# Patient Record
Sex: MALE | Race: WHITE | NOT HISPANIC OR LATINO | Employment: FULL TIME | URBAN - METROPOLITAN AREA
[De-identification: names, ages, dates, MRNs, and addresses within clinical notes are randomized per-mention and may not be internally consistent; named-entity substitution may affect disease eponyms.]

---

## 2023-03-24 ENCOUNTER — APPOINTMENT (OUTPATIENT)
Dept: CT IMAGING | Facility: HOSPITAL | Age: 48
End: 2023-03-24
Payer: COMMERCIAL

## 2023-03-24 ENCOUNTER — APPOINTMENT (OUTPATIENT)
Dept: GENERAL RADIOLOGY | Facility: HOSPITAL | Age: 48
End: 2023-03-24
Payer: COMMERCIAL

## 2023-03-24 ENCOUNTER — HOSPITAL ENCOUNTER (EMERGENCY)
Facility: HOSPITAL | Age: 48
Discharge: HOME | End: 2023-03-24
Attending: EMERGENCY MEDICINE | Admitting: EMERGENCY MEDICINE
Payer: COMMERCIAL

## 2023-03-24 VITALS
DIASTOLIC BLOOD PRESSURE: 100 MMHG | BODY MASS INDEX: 20.8 KG/M2 | HEIGHT: 70 IN | SYSTOLIC BLOOD PRESSURE: 138 MMHG | WEIGHT: 145.28 LBS | TEMPERATURE: 98.1 F | OXYGEN SATURATION: 97 % | RESPIRATION RATE: 20 BRPM | HEART RATE: 100 BPM

## 2023-03-24 DIAGNOSIS — R51.9 ACUTE NONINTRACTABLE HEADACHE, UNSPECIFIED HEADACHE TYPE: Primary | ICD-10-CM

## 2023-03-24 DIAGNOSIS — R74.8 ELEVATED LIVER ENZYMES: ICD-10-CM

## 2023-03-24 DIAGNOSIS — E86.0 DEHYDRATION: ICD-10-CM

## 2023-03-24 LAB
ALBUMIN SERPL-MCNC: 4.2 G/DL (ref 3.5–5.2)
ALBUMIN/GLOB SERPL: 1.1 G/DL
ALP SERPL-CCNC: 144 U/L (ref 39–117)
ALT SERPL W P-5'-P-CCNC: 105 U/L (ref 1–41)
ANION GAP SERPL CALCULATED.3IONS-SCNC: 14.4 MMOL/L (ref 5–15)
AST SERPL-CCNC: 88 U/L (ref 1–40)
BACTERIA UR QL AUTO: ABNORMAL /HPF
BASOPHILS # BLD AUTO: 0.04 10*3/MM3 (ref 0–0.2)
BASOPHILS NFR BLD AUTO: 0.4 % (ref 0–1.5)
BILIRUB SERPL-MCNC: 0.4 MG/DL (ref 0–1.2)
BILIRUB UR QL STRIP: NEGATIVE
BUN SERPL-MCNC: 23 MG/DL (ref 6–20)
BUN/CREAT SERPL: 23 (ref 7–25)
CALCIUM SPEC-SCNC: 9.9 MG/DL (ref 8.6–10.5)
CHLORIDE SERPL-SCNC: 95 MMOL/L (ref 98–107)
CLARITY UR: CLEAR
CO2 SERPL-SCNC: 22.6 MMOL/L (ref 22–29)
COLOR UR: ABNORMAL
CREAT SERPL-MCNC: 1 MG/DL (ref 0.76–1.27)
DEPRECATED RDW RBC AUTO: 37.9 FL (ref 37–54)
EGFRCR SERPLBLD CKD-EPI 2021: 92.8 ML/MIN/1.73
EOSINOPHIL # BLD AUTO: 0 10*3/MM3 (ref 0–0.4)
EOSINOPHIL NFR BLD AUTO: 0 % (ref 0.3–6.2)
ERYTHROCYTE [DISTWIDTH] IN BLOOD BY AUTOMATED COUNT: 12.4 % (ref 12.3–15.4)
FLUAV AG NPH QL: NEGATIVE
FLUBV AG NPH QL IA: NEGATIVE
GLOBULIN UR ELPH-MCNC: 3.7 GM/DL
GLUCOSE SERPL-MCNC: 127 MG/DL (ref 65–99)
GLUCOSE UR STRIP-MCNC: NEGATIVE MG/DL
HCT VFR BLD AUTO: 51 % (ref 37.5–51)
HGB BLD-MCNC: 17.7 G/DL (ref 13–17.7)
HGB UR QL STRIP.AUTO: NEGATIVE
HOLD SPECIMEN: NORMAL
HOLD SPECIMEN: NORMAL
HYALINE CASTS UR QL AUTO: ABNORMAL /LPF
IMM GRANULOCYTES # BLD AUTO: 0.03 10*3/MM3 (ref 0–0.05)
IMM GRANULOCYTES NFR BLD AUTO: 0.3 % (ref 0–0.5)
KETONES UR QL STRIP: ABNORMAL
LEUKOCYTE ESTERASE UR QL STRIP.AUTO: NEGATIVE
LYMPHOCYTES # BLD AUTO: 0.94 10*3/MM3 (ref 0.7–3.1)
LYMPHOCYTES NFR BLD AUTO: 9.8 % (ref 19.6–45.3)
MAGNESIUM SERPL-MCNC: 2.1 MG/DL (ref 1.6–2.6)
MCH RBC QN AUTO: 29.3 PG (ref 26.6–33)
MCHC RBC AUTO-ENTMCNC: 34.7 G/DL (ref 31.5–35.7)
MCV RBC AUTO: 84.4 FL (ref 79–97)
MONOCYTES # BLD AUTO: 0.96 10*3/MM3 (ref 0.1–0.9)
MONOCYTES NFR BLD AUTO: 10 % (ref 5–12)
MUCOUS THREADS URNS QL MICRO: ABNORMAL /HPF
NEUTROPHILS NFR BLD AUTO: 7.63 10*3/MM3 (ref 1.7–7)
NEUTROPHILS NFR BLD AUTO: 79.5 % (ref 42.7–76)
NITRITE UR QL STRIP: NEGATIVE
NRBC BLD AUTO-RTO: 0 /100 WBC (ref 0–0.2)
PH UR STRIP.AUTO: 5.5 [PH] (ref 5–8)
PLATELET # BLD AUTO: 217 10*3/MM3 (ref 140–450)
PMV BLD AUTO: 9.1 FL (ref 6–12)
POTASSIUM SERPL-SCNC: 4.6 MMOL/L (ref 3.5–5.2)
PROT SERPL-MCNC: 7.9 G/DL (ref 6–8.5)
PROT UR QL STRIP: ABNORMAL
RBC # BLD AUTO: 6.04 10*6/MM3 (ref 4.14–5.8)
RBC # UR STRIP: ABNORMAL /HPF
REF LAB TEST METHOD: ABNORMAL
SARS-COV-2 RNA RESP QL NAA+PROBE: NOT DETECTED
SODIUM SERPL-SCNC: 132 MMOL/L (ref 136–145)
SP GR UR STRIP: >1.03 (ref 1–1.03)
SQUAMOUS #/AREA URNS HPF: ABNORMAL /HPF
TROPONIN T SERPL HS-MCNC: <6 NG/L
UROBILINOGEN UR QL STRIP: ABNORMAL
WBC # UR STRIP: ABNORMAL /HPF
WBC NRBC COR # BLD: 9.6 10*3/MM3 (ref 3.4–10.8)
WHOLE BLOOD HOLD COAG: NORMAL
WHOLE BLOOD HOLD SPECIMEN: NORMAL

## 2023-03-24 PROCEDURE — 85025 COMPLETE CBC W/AUTO DIFF WBC: CPT | Performed by: EMERGENCY MEDICINE

## 2023-03-24 PROCEDURE — 99283 EMERGENCY DEPT VISIT LOW MDM: CPT

## 2023-03-24 PROCEDURE — 93005 ELECTROCARDIOGRAM TRACING: CPT | Performed by: EMERGENCY MEDICINE

## 2023-03-24 PROCEDURE — 25010000002 KETOROLAC TROMETHAMINE PER 15 MG: Performed by: EMERGENCY MEDICINE

## 2023-03-24 PROCEDURE — 36415 COLL VENOUS BLD VENIPUNCTURE: CPT | Performed by: EMERGENCY MEDICINE

## 2023-03-24 PROCEDURE — 81001 URINALYSIS AUTO W/SCOPE: CPT | Performed by: EMERGENCY MEDICINE

## 2023-03-24 PROCEDURE — 80053 COMPREHEN METABOLIC PANEL: CPT | Performed by: EMERGENCY MEDICINE

## 2023-03-24 PROCEDURE — 71045 X-RAY EXAM CHEST 1 VIEW: CPT

## 2023-03-24 PROCEDURE — 84484 ASSAY OF TROPONIN QUANT: CPT | Performed by: EMERGENCY MEDICINE

## 2023-03-24 PROCEDURE — 70450 CT HEAD/BRAIN W/O DYE: CPT

## 2023-03-24 PROCEDURE — 87804 INFLUENZA ASSAY W/OPTIC: CPT | Performed by: EMERGENCY MEDICINE

## 2023-03-24 PROCEDURE — 96375 TX/PRO/DX INJ NEW DRUG ADDON: CPT

## 2023-03-24 PROCEDURE — U0004 COV-19 TEST NON-CDC HGH THRU: HCPCS | Performed by: EMERGENCY MEDICINE

## 2023-03-24 PROCEDURE — C9803 HOPD COVID-19 SPEC COLLECT: HCPCS | Performed by: EMERGENCY MEDICINE

## 2023-03-24 PROCEDURE — 25010000002 DIPHENHYDRAMINE PER 50 MG: Performed by: EMERGENCY MEDICINE

## 2023-03-24 PROCEDURE — 93005 ELECTROCARDIOGRAM TRACING: CPT

## 2023-03-24 PROCEDURE — 25010000002 METOCLOPRAMIDE PER 10 MG: Performed by: EMERGENCY MEDICINE

## 2023-03-24 PROCEDURE — 93010 ELECTROCARDIOGRAM REPORT: CPT | Performed by: INTERNAL MEDICINE

## 2023-03-24 PROCEDURE — 83735 ASSAY OF MAGNESIUM: CPT | Performed by: EMERGENCY MEDICINE

## 2023-03-24 PROCEDURE — 96374 THER/PROPH/DIAG INJ IV PUSH: CPT

## 2023-03-24 RX ORDER — DIPHENHYDRAMINE HYDROCHLORIDE 50 MG/ML
25 INJECTION INTRAMUSCULAR; INTRAVENOUS ONCE
Status: COMPLETED | OUTPATIENT
Start: 2023-03-24 | End: 2023-03-24

## 2023-03-24 RX ORDER — KETOROLAC TROMETHAMINE 30 MG/ML
30 INJECTION, SOLUTION INTRAMUSCULAR; INTRAVENOUS ONCE
Status: COMPLETED | OUTPATIENT
Start: 2023-03-24 | End: 2023-03-24

## 2023-03-24 RX ORDER — METOCLOPRAMIDE HYDROCHLORIDE 5 MG/ML
10 INJECTION INTRAMUSCULAR; INTRAVENOUS ONCE
Status: COMPLETED | OUTPATIENT
Start: 2023-03-24 | End: 2023-03-24

## 2023-03-24 RX ORDER — SODIUM CHLORIDE 0.9 % (FLUSH) 0.9 %
10 SYRINGE (ML) INJECTION AS NEEDED
Status: DISCONTINUED | OUTPATIENT
Start: 2023-03-24 | End: 2023-03-24 | Stop reason: HOSPADM

## 2023-03-24 RX ADMIN — SODIUM CHLORIDE 1000 ML: 9 INJECTION, SOLUTION INTRAVENOUS at 16:16

## 2023-03-24 RX ADMIN — METOCLOPRAMIDE HYDROCHLORIDE 10 MG: 5 INJECTION INTRAMUSCULAR; INTRAVENOUS at 16:15

## 2023-03-24 RX ADMIN — KETOROLAC TROMETHAMINE 30 MG: 30 INJECTION, SOLUTION INTRAMUSCULAR; INTRAVENOUS at 16:15

## 2023-03-24 RX ADMIN — DIPHENHYDRAMINE HYDROCHLORIDE 25 MG: 50 INJECTION, SOLUTION INTRAMUSCULAR; INTRAVENOUS at 16:16

## 2023-03-24 RX ADMIN — SODIUM CHLORIDE 1000 ML: 9 INJECTION, SOLUTION INTRAVENOUS at 12:59

## 2023-03-24 NOTE — ED PROVIDER NOTES
"Time: 12:21 PM EDT  Date of encounter:  3/24/2023  Independent Historian/Clinical History and Information was obtained by:   Patient  Chief Complaint: headache, anorexia    History is limited by: N/A    History of Present Illness:  Patient is a 47 y.o. year old male who presents to the emergency department for evaluation of headache and anorexia. Patient reports he has been unable to eat for the past 3 days because \"he just can't eat.\" He also reports pressure in the back of his head for the past 3 days. He denies fall or head injury.    He also notes some weakness. He notes some shortness of breath that he thinks is due to \"panicking.\" He notes some photophobia, as well. He denies diminished appetite or nausea. He states he has been drinking fluids.    Patient reports no prior medical issues.    [facility]    Patient Care Team  Primary Care Provider: Provider, No Known    Past Medical History:     No Known Allergies  History reviewed. No pertinent past medical history.  History reviewed. No pertinent surgical history.  History reviewed. No pertinent family history.    Home Medications:  Prior to Admission medications    Not on File        Social History:   Social History     Tobacco Use   • Smoking status: Every Day     Packs/day: 1.00     Types: Cigarettes   • Smokeless tobacco: Never   Vaping Use   • Vaping Use: Never used   Substance Use Topics   • Alcohol use: Not Currently   • Drug use: Never         Review of Systems:  Review of Systems   Constitutional: Negative for appetite change, chills and fever.   HENT: Negative for sore throat.    Eyes: Positive for photophobia.   Respiratory: Positive for shortness of breath.    Cardiovascular: Negative for chest pain.   Gastrointestinal: Negative for abdominal pain, diarrhea, nausea and vomiting.   Genitourinary: Negative for dysuria.   Musculoskeletal: Negative for neck pain.   Skin: Negative for wound.   Neurological: Positive for weakness and headaches (back of head). " "  All other systems reviewed and are negative.       Physical Exam:  /100   Pulse 100   Temp 98.1 °F (36.7 °C) (Oral)   Resp 20   Ht 177.8 cm (70\")   Wt 65.9 kg (145 lb 4.5 oz)   SpO2 97%   BMI 20.85 kg/m²     Physical Exam  Vitals and nursing note reviewed.   Constitutional:       General: He is not in acute distress.  HENT:      Head: Normocephalic and atraumatic.   Eyes:      Extraocular Movements: Extraocular movements intact.   Cardiovascular:      Rate and Rhythm: Normal rate and regular rhythm.   Pulmonary:      Effort: Pulmonary effort is normal. No respiratory distress.      Breath sounds: Normal breath sounds.   Abdominal:      General: Abdomen is flat.      Palpations: Abdomen is soft.      Tenderness: There is no abdominal tenderness.   Musculoskeletal:         General: Normal range of motion.      Cervical back: Normal range of motion and neck supple.   Skin:     General: Skin is warm and dry.      Capillary Refill: Capillary refill takes less than 2 seconds.   Neurological:      Mental Status: He is alert and oriented to person, place, and time. Mental status is at baseline.                  Procedures:  Procedures      Medical Decision Making:      Comorbidities that affect care:    Smoking    External Notes reviewed:    None      The following orders were placed and all results were independently analyzed by me:  Orders Placed This Encounter   Procedures   • Influenza Antigen, Rapid - Swab, Nasopharynx   • COVID-19,APTIMA PANTHER(XUAN),[facility], NP/OP SWAB IN UTM/VTM/SALINE TRANSPORT MEDIA,24 HR TAT - Swab, Nasopharynx   • XR Chest 1 View   • CT Head Without Contrast   • [place] Draw   • Comprehensive Metabolic Panel   • Single High Sensitivity Troponin T   • Magnesium   • Urinalysis With Microscopic If Indicated (No Culture) - Urine, Clean Catch   • CBC Auto Differential   • Urinalysis, Microscopic Only - Urine, Clean Catch   • NPO Diet NPO Type: Strict NPO   • Undress & Gown   • " Cardiac Monitoring   • Continuous Pulse Oximetry   • Vital Signs   • Oxygen Therapy- Nasal Cannula; 2 LPM; Titrate for SPO2: 92%, Greater Than or Equal To   • POC Glucose Once   • ECG 12 Lead ED Triage Standing Order; Weak / Dizzy / AMS   • Insert Peripheral IV   • Fall Precautions   • CBC & Differential   • Green Top (Gel)   • Lavender Top   • Gold Top - SST   • Light Blue Top       Medications Given in the Emergency Department:  Medications   sodium chloride 0.9 % flush 10 mL (has no administration in time range)   sodium chloride 0.9 % bolus 1,000 mL (0 mL Intravenous Stopped 3/24/23 1546)   sodium chloride 0.9 % bolus 1,000 mL (1,000 mL Intravenous New Bag 3/24/23 1616)   ketorolac (TORADOL) injection 30 mg (30 mg Intravenous Given 3/24/23 1615)   metoclopramide (REGLAN) injection 10 mg (10 mg Intravenous Given 3/24/23 1615)   diphenhydrAMINE (BENADRYL) injection 25 mg (25 mg Intravenous Given 3/24/23 1616)        ED Course:    ED Course as of 03/24/23 1818   Fri Mar 24, 2023   1153 EKG:    Rhythm: Sinus tachycardia  Rate: 118  Intervals: Normal  T-wave: Nonspecific changes  ST Segment: Nonspecific changes    EKG Comparison: Not available    Interpreted by me   [NL]      ED Course User Index  [NL] Tee Ac DO       Labs:    Lab Results (last 24 hours)     Procedure Component Value Units Date/Time    CBC & Differential [[id number]]  (Abnormal) Collected: 03/24/23 1149    Specimen: Blood Updated: 03/24/23 1155    Narrative:      The following orders were created for panel order CBC & Differential.  Procedure                               Abnormality         Status                     ---------                               -----------         ------                     CBC Auto Differential[[id number]]        Abnormal            Final result                 Please view results for these tests on the individual orders.    Comprehensive Metabolic Panel [[id number]]  (Abnormal) Collected: 03/24/23 1149    Specimen:  Blood Updated: 03/24/23 1220     Glucose 127 mg/dL      BUN 23 mg/dL      Creatinine 1.00 mg/dL      Sodium 132 mmol/L      Potassium 4.6 mmol/L      Chloride 95 mmol/L      CO2 22.6 mmol/L      Calcium 9.9 mg/dL      Total Protein 7.9 g/dL      Albumin 4.2 g/dL      ALT (SGPT) 105 U/L      AST (SGOT) 88 U/L      Alkaline Phosphatase 144 U/L      Total Bilirubin 0.4 mg/dL      Globulin 3.7 gm/dL      A/G Ratio 1.1 g/dL      BUN/Creatinine Ratio 23.0     Anion Gap 14.4 mmol/L      eGFR 92.8 mL/min/1.73     Narrative:      GFR Normal >60  Chronic Kidney Disease <60  Kidney Failure <15      Single High Sensitivity Troponin T [[id number]]  (Normal) Collected: 03/24/23 1149    Specimen: Blood Updated: 03/24/23 1220     HS Troponin T <6 ng/L     Narrative:      High Sensitive Troponin T Reference Range:  <10.0 ng/L- Negative Female for AMI  <15.0 ng/L- Negative Male for AMI  >=10 - Abnormal Female indicating possible myocardial injury.  >=15 - Abnormal Male indicating possible myocardial injury.   Clinicians would have to utilize clinical acumen, EKG, Troponin, and serial changes to determine if it is an Acute Myocardial Infarction or myocardial injury due to an underlying chronic condition.         Magnesium [[id number]]  (Normal) Collected: 03/24/23 1149    Specimen: Blood Updated: 03/24/23 1220     Magnesium 2.1 mg/dL     CBC Auto Differential [[id number]]  (Abnormal) Collected: 03/24/23 1149    Specimen: Blood Updated: 03/24/23 1155     WBC 9.60 10*3/mm3      RBC 6.04 10*6/mm3      Hemoglobin 17.7 g/dL      Hematocrit 51.0 %      MCV 84.4 fL      MCH 29.3 pg      MCHC 34.7 g/dL      RDW 12.4 %      RDW-SD 37.9 fl      MPV 9.1 fL      Platelets 217 10*3/mm3      Neutrophil % 79.5 %      Lymphocyte % 9.8 %      Monocyte % 10.0 %      Eosinophil % 0.0 %      Basophil % 0.4 %      Immature Grans % 0.3 %      Neutrophils, Absolute 7.63 10*3/mm3      Lymphocytes, Absolute 0.94 10*3/mm3      Monocytes, Absolute 0.96 10*3/mm3       Eosinophils, Absolute 0.00 10*3/mm3      Basophils, Absolute 0.04 10*3/mm3      Immature Grans, Absolute 0.03 10*3/mm3      nRBC 0.0 /100 WBC     Urinalysis With Microscopic If Indicated (No Culture) - Urine, Clean Catch [[id number]]  (Abnormal) Collected: 03/24/23 1513    Specimen: Urine, Clean Catch Updated: 03/24/23 1541     Color, UA Dark Yellow     Appearance, UA Clear     pH, UA 5.5     Specific Gravity, UA >1.030     Glucose, UA Negative     Ketones, UA 15 mg/dL (1+)     Bilirubin, UA Negative     Blood, UA Negative     Protein,  mg/dL (2+)     Leuk Esterase, UA Negative     Nitrite, UA Negative     Urobilinogen, UA 1.0 E.U./dL    Urinalysis, Microscopic Only - Urine, Clean Catch [[id number]]  (Abnormal) Collected: 03/24/23 1513    Specimen: Urine, Clean Catch Updated: 03/24/23 1542     RBC, UA None Seen /HPF      WBC, UA 0-2 /HPF      Bacteria, UA Trace /HPF      Squamous Epithelial Cells, UA 0-2 /HPF      Hyaline Casts, UA None Seen /LPF      Mucus, UA Moderate/2+ /HPF      Methodology Manual Light Microscopy    Influenza Antigen, Rapid - Swab, Nasopharynx [[id number]] Collected: 03/24/23 1757    Specimen: Swab from Nasopharynx Updated: 03/24/23 1802    COVID-19,APTIMA PANTHER(XUAN),[facility], NP/OP SWAB IN UTM/VTM/SALINE TRANSPORT MEDIA,24 HR TAT - Swab, Nasopharynx [[id number]] Collected: 03/24/23 1757    Specimen: Swab from Nasopharynx Updated: 03/24/23 1802           Imaging:    CT Head Without Contrast    Result Date: 3/24/2023  PROCEDURE: CT HEAD WO CONTRAST  COMPARISON:  None INDICATIONS: headache  PROTOCOL:   Standard imaging protocol performed    RADIATION:   DLP: 1018.2 mGy*cm   MA and/or KV was adjusted to minimize radiation dose.     TECHNIQUE: After obtaining the patient's consent, CT images were obtained without non-ionic intravenous contrast material.  FINDINGS:  No acute skull abnormality.  No fluid in the visualized paranasal sinuses/middle ear cavities.  Intracranially, no  hemorrhage, edema, or mass effect.  No abnormal extra-axial fluid collection.  CSF spaces/ventricles within normal limits       Negative     KAT VALDOVINOS MD       Electronically Signed and Approved By: KAT VALDOVINOS MD on 3/24/2023 at 12:59             XR Chest 1 View    Result Date: 3/24/2023  PROCEDURE: XR CHEST 1 VW  COMPARISON: [facility], CR, CHEST PA/AP & LAT 2V, 7/02/2010, 20:06.  INDICATIONS: Weak/Dizzy/AMS triage protocol  FINDINGS:  There is no pneumothorax, pleural effusion or focal airspace consolidation. The heart size and pulmonary vasculature appear within normal limits.  There is a large calcified left hilar granuloma with small granulomas in the left lower lung.  There are multiple old healed left-sided rib fractures posteriorly.  There are no acute osseous abnormalities.       No acute cardiopulmonary abnormality.       ISAIAH BOGGS MD       Electronically Signed and Approved By: ISAIAH BOGGS MD on 3/24/2023 at 12:12                 Differential Diagnosis and Discussion:    Headache: Differential diagnosis includes but is not limited to migraine, cluster headache, hypertension, tumor, subarachnoid bleeding, pseudotumor cerebri, temporal arteritis, infections, tension headache, and TMJ syndrome.    All labs were reviewed and interpreted by me.  All X-rays were independently reviewed by me.  EKG was interpreted by me.  CT scan radiology interpretation was reviewed by me.    MDM   47-year-old male patient presents with 3-day history of headache which is dull pain at the base of his skull.  Patient states he does not feel like eating and has metallic taste in his mouth.  Patient's head CT did not show any acute abnormalities.  Patient initially was tachycardic and his heart rate improved with fluids.  Patient's lab work suggest dehydration.  Patient's headache was improved with migraine cocktail.  Patient's liver enzymes are mildly elevated with no prior labs to compare to.   Patient is stable for discharge with recommended GI follow-up.    Patient Care Considerations:    NARCOTICS: I considered prescribing opiate pain medication as an outpatient, however Patient states that he make him sick to his stomach      Consultants/Shared Management Plan:    None    Social Determinants of Health:    Patient is independent, reliable, and has access to care.       Disposition and Care Coordination:    Discharged: The patient is suitable and stable for discharge with no need for consideration of observation or admission.      Final diagnoses:   Acute nonintractable headache, unspecified headache type   Elevated liver enzymes   Dehydration        ED Disposition     ED Disposition   Discharge    Condition   Stable    Comment   --             This medical record created using voice recognition software.      Documentation assistance provided by Ezequiel Luna acting as scribe for Tee Ac DO. Information recorded by the scribe was done at my direction and has been verified and validated by me.       Ezequiel Luna  03/24/23 1455       Tee Ac DO  03/24/23 [id number]

## 2023-03-24 NOTE — DISCHARGE INSTRUCTIONS
May take Motrin and/or Tylenol as needed for pain.  Recommend to follow-up with Dr. Gonzalez, gastroenterology, regarding elevated liver enzymes.

## 2023-03-25 LAB — QT INTERVAL: 296 MS

## 2025-03-25 ENCOUNTER — HOSPITAL ENCOUNTER (EMERGENCY)
Facility: HOSPITAL | Age: 50
Discharge: HOME | End: 2025-03-25
Attending: EMERGENCY MEDICINE | Admitting: EMERGENCY MEDICINE
Payer: COMMERCIAL

## 2025-03-25 ENCOUNTER — APPOINTMENT (OUTPATIENT)
Dept: CT IMAGING | Facility: HOSPITAL | Age: 50
End: 2025-03-25
Payer: COMMERCIAL

## 2025-03-25 ENCOUNTER — APPOINTMENT (OUTPATIENT)
Dept: GENERAL RADIOLOGY | Facility: HOSPITAL | Age: 50
End: 2025-03-25
Payer: COMMERCIAL

## 2025-03-25 VITALS
OXYGEN SATURATION: 98 % | DIASTOLIC BLOOD PRESSURE: 108 MMHG | SYSTOLIC BLOOD PRESSURE: 159 MMHG | TEMPERATURE: 98.7 F | BODY MASS INDEX: 24.27 KG/M2 | RESPIRATION RATE: 16 BRPM | HEART RATE: 72 BPM | WEIGHT: 169.53 LBS | HEIGHT: 70 IN

## 2025-03-25 DIAGNOSIS — S20.211A CONTUSION OF RIB ON RIGHT SIDE, INITIAL ENCOUNTER: ICD-10-CM

## 2025-03-25 DIAGNOSIS — M25.531 RIGHT WRIST PAIN: ICD-10-CM

## 2025-03-25 DIAGNOSIS — S22.31XA CLOSED FRACTURE OF ONE RIB OF RIGHT SIDE, INITIAL ENCOUNTER: Primary | ICD-10-CM
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
## 2025-03-25 DIAGNOSIS — S67.21XA HAND CRUSH INJURY, RIGHT, INITIAL ENCOUNTER: ICD-10-CM

## 2025-03-25 PROCEDURE — 99284 EMERGENCY DEPT VISIT MOD MDM: CPT

## 2025-03-25 PROCEDURE — 73130 X-RAY EXAM OF HAND: CPT

## 2025-03-25 PROCEDURE — 71045 X-RAY EXAM CHEST 1 VIEW: CPT

## 2025-03-25 PROCEDURE — 71250 CT THORAX DX C-: CPT

## 2025-03-25 RX ORDER — HYDROCODONE BITARTRATE AND ACETAMINOPHEN 5; 325 MG/1; MG/1
1 TABLET ORAL EVERY 6 HOURS PRN
Qty: 12 TABLET | Refills: 0 | Status: SHIPPED | OUTPATIENT
Start: 2025-03-25

## 2025-03-25 RX ORDER — HYDROCODONE BITARTRATE AND ACETAMINOPHEN 5; 325 MG/1; MG/1
1 TABLET ORAL ONCE
Refills: 0 | Status: COMPLETED | OUTPATIENT
Start: 2025-03-25 | End: 2025-03-25

## 2025-03-25 RX ADMIN — HYDROCODONE BITARTRATE AND ACETAMINOPHEN 1 TABLET: 5; 325 TABLET ORAL at 19:45

## 2025-03-25 NOTE — ED PROVIDER NOTES
"Time: 5:41 PM EDT  Date of encounter:  3/25/2025  Independent Historian/Clinical History and Information was obtained by:   Patient    History is limited by: N/A    Chief Complaint: Hand pain      History of Present Illness:  Patient is a 49 y.o. year old male who presents to the emergency department for evaluation of right hand pain.  Patient states he was trampled by a cow at the [place] where he works.  States that stepped on his right posterior shoulder area and his right hand.  Denies getting hit in the head.  Denies LOC or shortness of breath.       Patient Care Team  Primary Care Provider: Provider, No Known    Past Medical History:     No Known Allergies  History reviewed. No pertinent past medical history.  History reviewed. No pertinent surgical history.  History reviewed. No pertinent family history.    Home Medications:  Prior to Admission medications    Not on File        Social History:   Social History     Tobacco Use    Smoking status: Every Day     Current packs/day: 1.00     Types: Cigarettes    Smokeless tobacco: Never   Vaping Use    Vaping status: Never Used   Substance Use Topics    Alcohol use: Not Currently    Drug use: Never         Review of Systems:  Review of Systems   Respiratory:  Negative for cough and shortness of breath.    Cardiovascular:  Positive for chest pain (Steroid right ribs).   Gastrointestinal:  Negative for abdominal pain.   Genitourinary:  Negative for flank pain and hematuria.   Musculoskeletal:  Positive for arthralgias, back pain (Posterior right upper ribs) and joint swelling (Right hand).   Skin:  Positive for wound (Bruising right ribs).   Neurological: Negative.    Hematological: Negative.    Psychiatric/Behavioral: Negative.     All other systems reviewed and are negative.       Physical Exam:  /92   Pulse 70   Temp 98.5 °F (36.9 °C) (Oral)   Resp 21   Ht 177.8 cm (70\")   Wt 76.9 kg (169 lb 8.5 oz)   SpO2 100%   BMI 24.33 kg/m²     Physical " Exam  Vitals and nursing note reviewed.   Constitutional:       General: He is not in acute distress.     Appearance: Normal appearance. He is not toxic-appearing.   HENT:      Head: Normocephalic and atraumatic.      Right Ear: Tympanic membrane, ear canal and external ear normal.      Left Ear: Tympanic membrane, ear canal and external ear normal.      Nose: Nose normal.      Mouth/Throat:      Mouth: Mucous membranes are moist.   Eyes:      General: No scleral icterus.     Conjunctiva/sclera: Conjunctivae normal.      Pupils: Pupils are equal, round, and reactive to light.   Cardiovascular:      Rate and Rhythm: Normal rate and regular rhythm.      Pulses: Normal pulses.      Heart sounds: Normal heart sounds.   Pulmonary:      Effort: Pulmonary effort is normal. No respiratory distress.      Breath sounds: Normal breath sounds.   Chest:      Chest wall: Tenderness (Right posterior upper lateral right ribs tenderness with palm-sized area of abrasion and bruising) present.   Abdominal:      General: Bowel sounds are normal. There is no distension.      Palpations: Abdomen is soft.      Tenderness: There is no abdominal tenderness.   Musculoskeletal:         General: Swelling (Right proximal hand) and tenderness (Proximal hand) present.      Cervical back: Normal range of motion and neck supple. No tenderness.      Comments: Limited range of motion in right hand due to pain and swelling.  Patient cannot perform a full fist because of severe pain   Skin:     General: Skin is warm and dry.      Capillary Refill: Capillary refill takes less than 2 seconds.      Coloration: Skin is not cyanotic.   Neurological:      General: No focal deficit present.      Mental Status: He is alert and oriented to person, place, and time. Mental status is at baseline.      Sensory: No sensory deficit.      Motor: No weakness.   Psychiatric:         Attention and Perception: Attention and perception normal.         Mood and Affect: Mood  normal.                    Medical Decision Making:      Comorbidities that affect care:    Smoking    External Notes reviewed:    Previous Clinic Note: Patient last seen last year in March for colon cancer screening by his PCP      The following orders were placed and all results were independently analyzed by me:  Orders Placed This Encounter   Procedures    [facility] 04. Wrist Brace (); Right    XR Hand 3+ View Right    XR Chest 1 View    CT Chest Without Contrast Diagnostic    Ambulatory Referral to Family Practice       Medications Given in the Emergency Department:  Medications   HYDROcodone-acetaminophen (NORCO) 5-325 MG per tablet 1 tablet (1 tablet Oral Given 3/25/25 1945)        ED Course:    ED Course as of 03/25/25 2052   Tue Mar 25, 2025   1741 --- PROVIDER IN TRIAGE NOTE ---    The patient was evaluated by Erica raines in triage. Orders were placed and the patient is currently awaiting disposition.    [AJ]   1918 XR Chest 1 View  No acute findings [DS]   1918 XR Hand 3+ View Right  No acute findings [DS]   2041 CT Chest Without Contrast Diagnostic  Right sixth rib fracture no other findings [DS]      ED Course User Index  [AJ] Erica Lawler PA-C  [DS] Marysol Beaver APRN       Labs:    Lab Results (last 24 hours)       ** No results found for the last 24 hours. **             Imaging:    CT Chest Without Contrast Diagnostic  Result Date: 3/25/2025  CT CHEST WO CONTRAST DIAGNOSTIC Date of Exam: 3/25/2025 7:32 PM EDT Indication: Trauma. Comparison: Chest radiograph dated 3/25/2025 Technique: Axial CT images were obtained of the chest without contrast administration.  Reconstructed coronal and sagittal images were also obtained. Automated exposure control and iterative construction methods were used. Findings: The visualized soft tissue structures at the base the neck including the thyroid appear within normal limits. There is no lower cervical or axillary adenopathy. The heart  size is normal. There is no pericardial effusion. The aorta is normal in caliber without evidence of aneurysm formation. Minimal residual anterior thymic tissue. The main pulmonary artery appears normal in caliber. There is no mediastinal or hilar lymphadenopathy by size criteria. There are calcified left hilar lymph nodes likely related to chronic granulomatous disease. There is centrilobular and paraseptal emphysema. The central airways are patent. There is no abnormal bronchial wall thickening or bronchiectasis. There is no acute airspace consolidation, pleural effusion, or pneumothorax. There are no suspicious pulmonary nodules. The esophagus is normal in course and caliber. Visualized portions of the upper abdomen demonstrate no acute findings. There is degenerative disc disease of the cervical and thoracic spine. No evidence of sternal fracture. There is nondisplaced simple right anterior sixth rib fracture. There are multiple old healed left posterior rib fracture deformities.     Impression: 1. Nondisplaced simple right anterior sixth rib fracture. Correlate clinically for point tenderness. 2. Centrilobular emphysema without suspicious pulmonary nodule. Electronically Signed: Everett Care-n-Share  3/25/2025 8:36 PM EDT  Workstation ID: [id number]    XR Chest 1 View  Result Date: 3/25/2025  XR CHEST 1 VW Date of Exam: 3/25/2025 6:16 PM EDT Indication: cow stepped on back-some SOB Comparison: Chest radiograph dated 3/24/2023 Findings: The cardiomediastinal silhouette is within normal limits. Pulmonary vascularity appears normal. There is no focal airspace consolidation, pleural effusion, or pneumothorax. There are calcified granulomas within the left hilum. There are old healed left posterior rib fractures.     Impression: 1.No acute cardiopulmonary abnormality. 2.Old healed left posterior rib fractures. Electronically Signed: Everett [facility]n-[facility]  3/25/2025 6:45 PM EDT  Workstation ID: [id number]    XR Hand 3+ View  Right  Result Date: 3/25/2025  XR HAND 3+ VW RIGHT Date of Exam: 3/25/2025 6:12 PM EDT Indication: Cow stepped on wrist Comparison: None available. Findings: There is no acute fracture or dislocation. There is soft tissue swelling of the wrist. There is degenerative cyst formation at the lunate and triquetrum. The radiocarpal joint appears in normal alignment. There is degenerative joint space narrowing at the interphalangeal joints of the fingers.     Impression: 1.No acute osseous abnormality of the right hand. 2.Soft tissue swelling of the wrist. 3.Degenerative cyst formation at the lunate and triquetrum. Electronically Signed: Everett Tyson  3/25/2025 6:35 PM EDT  Workstation ID: [id number]        Differential Diagnosis and Discussion:    Trauma:  Differential diagnosis considered but not limited to were subarachnoid hemorrhage, intracranial bleeding, pneumothorax, cardiac contusion, lung contusion, intra-abdominal bleeding, and compartment syndrome of any extremity or other significant traumatic pathology    PROCEDURES:    X-ray were performed in the emergency department and all X-ray impressions were independently interpreted by me.  CT scan was performed in the emergency department and the CT scan radiology impression was interpreted by me.    No orders to display       Procedures    MDM  Number of Diagnoses or Management Options  Closed fracture of one rib of right side, initial encounter  Contusion of rib on right side, initial encounter  Hand crush injury, right, initial encounter  Right wrist pain  Struck by cow, initial encounter  Diagnosis management comments: I have explained the patient´s condition, diagnoses and treatment plan based on the information available to me at this time. I have answered questions and addressed any concerns. The patient has a good  understanding of the patient´s diagnosis, condition, and treatment plan as can be expected at this point. The vital signs have been stable. The  patient´s condition is stable and appropriate for discharge from the emergency department.      The patient will pursue further outpatient evaluation with the primary care physician or other designated or consulting physician as outlined in the discharge instructions. They are agreeable to this plan of care and follow-up instructions have been explained in detail. The patient has received these instructions in written format and have expressed an understanding of the discharge instructions. The patient is aware that any significant change in condition or worsening of symptoms should prompt an immediate return to this or the closest emergency department or call to 911.       Amount and/or Complexity of Data Reviewed  Tests in the radiology section of CPT®: reviewed and ordered  Tests in the medicine section of CPT®: ordered and reviewed    Risk of Complications, Morbidity, and/or Mortality  Presenting problems: moderate  Diagnostic procedures: moderate  Management options: low    Patient Progress  Patient progress: stable             Patient Care Considerations:    CONSULT: I considered consulting orthopedic surgery, however no acute bony abnormality of the hand      Consultants/Shared Management Plan:    None    Social Determinants of Health:    Patient is independent, reliable, and has access to care.       Disposition and Care Coordination:    Discharged: I considered escalation of care by admitting this patient to the hospital, however patient has nondisplaced rib fractures but is not hypoxic or tachypneic    I have explained the patient´s condition, diagnoses and treatment plan based on the information available to me at this time. I have answered questions and addressed any concerns. The patient has a good  understanding of the patient´s diagnosis, condition, and treatment plan as can be expected at this point. The vital signs have been stable. The patient´s condition is stable and appropriate for discharge  from the emergency department.      The patient will pursue further outpatient evaluation with the primary care physician or other designated or consulting physician as outlined in the discharge instructions. They are agreeable to this plan of care and follow-up instructions have been explained in detail. The patient has received these instructions in written format and has expressed an understanding of the discharge instructions. The patient is aware that any significant change in condition or worsening of symptoms should prompt an immediate return to this or the closest emergency department or call to 911.  I have explained discharge medications and the need for follow up with the patient/caretakers. This was also printed in the discharge instructions. Patient was discharged with the following medications and follow up:      Medication List        New Prescriptions      diclofenac 50 MG EC tablet  Commonly known as: VOLTAREN  Take 1 tablet by mouth 3 (Three) Times a Day As Needed (pain).     HYDROcodone-acetaminophen 5-325 MG per tablet  Commonly known as: NORCO  Take 1 tablet by mouth Every 6 (Six) Hours As Needed for Severe Pain.               Where to Get Your Medications        These medications were sent to [facility]/pharmacy #[id number] - [place], [place] - [address] - [phone]  - [phone]   [address] [place] [address]      Hours: 24-hours Phone: [phone]   diclofenac 50 MG EC tablet  HYDROcodone-acetaminophen 5-325 MG per tablet      Provider, No Known  [facility]  [place] [address]             Final diagnoses:   Hand crush injury, right, initial encounter   Right wrist pain   Closed fracture of one rib of right side, initial encounter   Contusion of rib on right side, initial encounter   Struck by cow, initial encounter        ED Disposition       ED Disposition   Discharge    Condition   Stable    Comment   --               This medical record created using voice  recognition software.             Marysol Beaver APRN  03/25/25 2052       Marysol Beaver APRN  03/25/25 2052

## 2025-03-26 NOTE — DISCHARGE INSTRUCTIONS
Split to hand/ wrist for comfort.  Ice and elevate  Nondisplaced 6th rib fx noted.  Limit lifting to less than 5 lbs  Medication as prescribed

## 2025-04-14 ENCOUNTER — OFFICE VISIT (OUTPATIENT)
Dept: FAMILY MEDICINE CLINIC | Facility: CLINIC | Age: 50
End: 2025-04-14
Payer: COMMERCIAL

## 2025-04-14 ENCOUNTER — TELEPHONE (OUTPATIENT)
Dept: GASTROENTEROLOGY | Facility: CLINIC | Age: 50
End: 2025-04-14
Payer: COMMERCIAL

## 2025-04-14 VITALS
OXYGEN SATURATION: 97 % | HEIGHT: 70 IN | DIASTOLIC BLOOD PRESSURE: 93 MMHG | BODY MASS INDEX: 23.54 KG/M2 | HEART RATE: 84 BPM | SYSTOLIC BLOOD PRESSURE: 143 MMHG | WEIGHT: 164.4 LBS

## 2025-04-14 DIAGNOSIS — S67.21XS: ICD-10-CM

## 2025-04-14 DIAGNOSIS — Z11.59 ENCOUNTER FOR HEPATITIS C SCREENING TEST FOR LOW RISK PATIENT: ICD-10-CM

## 2025-04-14 DIAGNOSIS — Z00.00 ENCOUNTER FOR MEDICAL EXAMINATION TO ESTABLISH CARE: Primary | ICD-10-CM
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
## 2025-04-14 DIAGNOSIS — M79.641 RIGHT HAND PAIN: ICD-10-CM

## 2025-04-14 DIAGNOSIS — Z12.11 COLON CANCER SCREENING: ICD-10-CM

## 2025-04-14 DIAGNOSIS — R07.89 OTHER CHEST PAIN: ICD-10-CM

## 2025-04-14 DIAGNOSIS — Z13.220 SCREENING CHOLESTEROL LEVEL: ICD-10-CM

## 2025-04-14 DIAGNOSIS — R03.0 ELEVATED BP WITHOUT DIAGNOSIS OF HYPERTENSION: ICD-10-CM

## 2025-04-14 DIAGNOSIS — R73.9 HYPERGLYCEMIA: ICD-10-CM

## 2025-04-14 NOTE — TELEPHONE ENCOUNTER
LVM: for patient to call back about a referral we received. Patient is not established with any  Gastro provider & can be scheduled with next available screening call, if he has no provider preference.

## 2025-04-14 NOTE — PROGRESS NOTES
"    Subjective:       Anthony Christensen is a 49 y.o. male with a concurrent medical history of current everyday smoking who presents to establish care.     Anthony was seen in our ED on 3/26/2025 after he was trampled by a cow. I reviewed that note. XR showed soft tissue selling and a degenerative cyst but no fracture. Results below:     He was discharged with voltaren and norco prescriptions.     He is still having pain with palpation and persistent loss of  strength. Will order MRI and refer to hand specialist today.     He also fractured ribs at that time, as shown by CT. Those symptoms have improved.    Current everyday smoking - recommended cessation. No nodule seen on chest CT after cow injury. Some early emphysematous changes and he does have some dyspnea but does not want to start inhaler today.     For two years, he has had intermittent substernal chest pain with strenuous physical activty. Given smoking history, will order stress test.     Declines vaccinations     Past Medical Hx:  Past Medical History:   Diagnosis Date    Asthma        Past Surgical Hx:  History reviewed. No pertinent surgical history.    Current Meds:    Current Outpatient Medications:     diclofenac (VOLTAREN) 50 MG EC tablet, Take 1 tablet by mouth 3 (Three) Times a Day As Needed (pain)., Disp: 30 tablet, Rfl: 0    Allergies:  No Known Allergies    Family Hx:  History reviewed. No pertinent family history.     Social History:  Social History     Socioeconomic History    Marital status:    Tobacco Use    Smoking status: Every Day     Current packs/day: 1.00     Average packs/day: 1 pack/day for 43.3 years (43.3 ttl pk-yrs)     Types: Cigarettes     Start date: 1982    Smokeless tobacco: Never   Vaping Use    Vaping status: Never Used   Substance and Sexual Activity    Alcohol use: Not Currently     Comment: occasional    Drug use: Yes     Types: Marijuana, \"Crack\" cocaine, Methamphetamines    Sexual activity: Defer       Review " "of Systems  Review of Systems   Respiratory:  Positive for cough and shortness of breath. Negative for chest tightness and wheezing.    Cardiovascular:  Positive for chest pain (1-2 years).       Objective:     /93 (BP Location: Left arm, Patient Position: Sitting, Cuff Size: Adult)   Pulse 84   Ht 177.8 cm (70\")   Wt 74.6 kg (164 lb 6.4 oz)   SpO2 97%   BMI 23.59 kg/m²   Physical Exam  Constitutional:       General: He is not in acute distress.     Appearance: Normal appearance. He is not ill-appearing, toxic-appearing or diaphoretic.   Cardiovascular:      Rate and Rhythm: Normal rate.   Pulmonary:      Effort: Pulmonary effort is normal.      Breath sounds: Normal breath sounds.   Neurological:      Mental Status: He is alert.   Psychiatric:         Mood and Affect: Mood normal.         Behavior: Behavior normal.          Assessment/Plan:     Diagnoses and all orders for this visit:    1. Encounter for medical examination to establish care (Primary)    2. Hyperglycemia  -     Comprehensive metabolic panel; Future  -     Hemoglobin A1c; Future  -     CBC No Differential; Future    3. Screening cholesterol level  -     Lipid panel; Future    4. Right hand pain  5. Struck by cow, sequela  6. Hand crush injury, right, sequela    Anthony was seen in our ED on 3/26/2025 after he was trampled by a cow. I reviewed that note. XR showed soft tissue selling and a degenerative cyst but no fracture. Results below:     He was discharged with voltaren and norco prescriptions.     He is still having pain with palpation and persistent loss of  strength. Will order MRI and refer to hand specialist today.     He also fractured ribs at that time, as shown by CT. Those symptoms have improved.    Current everyday smoking - recommended cessation. No nodule seen on chest CT after cow injury. Some early emphysematous changes and he does have some dyspnea but does not want to start inhaler today.     -     MRI Hand Right " Without Contrast; Future  -     Ambulatory Referral to Hand Surgery    7. Encounter for hepatitis C screening test for low risk patient  -     Hepatitis C antibody; Future    8. Other chest pain    For two years, he has had intermittent substernal chest pain with strenuous physical activty. Given smoking history, will order stress test.     -     Treadmill Stress test; Future    9. Colon cancer screening  -     Ambulatory Referral For Screening Colonoscopy    10. Elevated BP without diagnosis of hypertension    BP elevated - will bring back in one month to recheck.       Follow-up:     Return in about 1 month (around 5/14/2025) for Blood pressure recheck .    Preventative:  Health Maintenance   Topic Date Due    Pneumococcal Vaccine 50+ (1 of 2 - PCV) Never done    TDAP/TD VACCINES (1 - Tdap) Never done    COLORECTAL CANCER SCREENING  Never done    ZOSTER VACCINE (1 of 2) Never done    COVID-19 Vaccine (1 - 2024-25 season) Never done    HEPATITIS C SCREENING  Never done    ANNUAL PHYSICAL  Never done    INFLUENZA VACCINE  07/01/2025         This document has been electronically signed by Shady Anderson MD on April 14, 2025 14:11 EDT       Parts of this note are electronic transcriptions/translations of spoken language to printed text using the Dragon Dictation system.

## 2025-04-16 ENCOUNTER — LAB (OUTPATIENT)
Dept: LAB | Facility: HOSPITAL | Age: 50
End: 2025-04-16
Payer: COMMERCIAL

## 2025-04-16 DIAGNOSIS — Z11.59 ENCOUNTER FOR HEPATITIS C SCREENING TEST FOR LOW RISK PATIENT: ICD-10-CM

## 2025-04-16 DIAGNOSIS — Z13.220 SCREENING CHOLESTEROL LEVEL: ICD-10-CM

## 2025-04-16 DIAGNOSIS — R73.9 HYPERGLYCEMIA: ICD-10-CM

## 2025-04-16 LAB
ALBUMIN SERPL-MCNC: 4.3 G/DL (ref 3.5–5.2)
ALBUMIN/GLOB SERPL: 2 G/DL
ALP SERPL-CCNC: 134 U/L (ref 39–117)
ALT SERPL W P-5'-P-CCNC: 16 U/L (ref 1–41)
ANION GAP SERPL CALCULATED.3IONS-SCNC: 8 MMOL/L (ref 5–15)
AST SERPL-CCNC: 19 U/L (ref 1–40)
BILIRUB SERPL-MCNC: 0.3 MG/DL (ref 0–1.2)
BUN SERPL-MCNC: 14 MG/DL (ref 6–20)
BUN/CREAT SERPL: 20.9 (ref 7–25)
CALCIUM SPEC-SCNC: 9.2 MG/DL (ref 8.6–10.5)
CHLORIDE SERPL-SCNC: 104 MMOL/L (ref 98–107)
CHOLEST SERPL-MCNC: 160 MG/DL (ref 0–200)
CO2 SERPL-SCNC: 25 MMOL/L (ref 22–29)
CREAT SERPL-MCNC: 0.67 MG/DL (ref 0.76–1.27)
DEPRECATED RDW RBC AUTO: 40.1 FL (ref 37–54)
EGFRCR SERPLBLD CKD-EPI 2021: 113.7 ML/MIN/1.73
ERYTHROCYTE [DISTWIDTH] IN BLOOD BY AUTOMATED COUNT: 12.9 % (ref 12.3–15.4)
GLOBULIN UR ELPH-MCNC: 2.2 GM/DL
GLUCOSE SERPL-MCNC: 88 MG/DL (ref 65–99)
HBA1C MFR BLD: 5.6 % (ref 4.8–5.6)
HCT VFR BLD AUTO: 43.1 % (ref 37.5–51)
HCV AB SER QL: NORMAL
HDLC SERPL-MCNC: 51 MG/DL (ref 40–60)
HGB BLD-MCNC: 14.3 G/DL (ref 13–17.7)
LDLC SERPL CALC-MCNC: 86 MG/DL (ref 0–100)
LDLC/HDLC SERPL: 1.63 {RATIO}
MCH RBC QN AUTO: 28.5 PG (ref 26.6–33)
MCHC RBC AUTO-ENTMCNC: 33.2 G/DL (ref 31.5–35.7)
MCV RBC AUTO: 86 FL (ref 79–97)
PLATELET # BLD AUTO: 371 10*3/MM3 (ref 140–450)
PMV BLD AUTO: 10.5 FL (ref 6–12)
POTASSIUM SERPL-SCNC: 4.4 MMOL/L (ref 3.5–5.2)
PROT SERPL-MCNC: 6.5 G/DL (ref 6–8.5)
RBC # BLD AUTO: 5.01 10*6/MM3 (ref 4.14–5.8)
SODIUM SERPL-SCNC: 137 MMOL/L (ref 136–145)
TRIGL SERPL-MCNC: 130 MG/DL (ref 0–150)
VLDLC SERPL-MCNC: 23 MG/DL (ref 5–40)
WBC NRBC COR # BLD AUTO: 10.45 10*3/MM3 (ref 3.4–10.8)

## 2025-04-16 PROCEDURE — 80053 COMPREHEN METABOLIC PANEL: CPT

## 2025-04-16 PROCEDURE — 80061 LIPID PANEL: CPT

## 2025-04-16 PROCEDURE — 86803 HEPATITIS C AB TEST: CPT

## 2025-04-16 PROCEDURE — 83036 HEMOGLOBIN GLYCOSYLATED A1C: CPT

## 2025-04-16 PROCEDURE — 36415 COLL VENOUS BLD VENIPUNCTURE: CPT

## 2025-04-16 PROCEDURE — 85027 COMPLETE CBC AUTOMATED: CPT

## 2025-04-16 NOTE — TELEPHONE ENCOUNTER
Patient returned call and has scheduled a phone screening appointment with Dr. Eaton office on 5/7/25@9:00. This is the next available screening appointment.

## 2025-04-16 NOTE — TELEPHONE ENCOUNTER
Left voice message patient to call the office regarding  a referral we received from Shady Anderson for a colon screening. Patient is not established with any  Gastro provider & can be scheduled with next available screening call, if he has no provider preference. Deferring referral for two days to give patient time to return call.

## 2025-04-17 ENCOUNTER — RESULTS FOLLOW-UP (OUTPATIENT)
Dept: FAMILY MEDICINE CLINIC | Facility: CLINIC | Age: 50
End: 2025-04-17
Payer: COMMERCIAL

## 2025-04-17 DIAGNOSIS — R74.8 ELEVATED ALKALINE PHOSPHATASE LEVEL: Primary | ICD-10-CM

## 2025-04-24 ENCOUNTER — HOSPITAL ENCOUNTER (OUTPATIENT)
Dept: MRI IMAGING | Facility: HOSPITAL | Age: 50
Discharge: HOME | End: 2025-04-24
Admitting: STUDENT IN AN ORGANIZED HEALTH CARE EDUCATION/TRAINING PROGRAM
Payer: COMMERCIAL

## 2025-04-24 DIAGNOSIS — S67.21XS: ICD-10-CM
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
## 2025-04-24 DIAGNOSIS — M79.641 RIGHT HAND PAIN: ICD-10-CM

## 2025-04-24 PROCEDURE — 73218 MRI UPPER EXTREMITY W/O DYE: CPT

## 2025-04-29 DIAGNOSIS — S62.151A CLOSED DISPLACED FRACTURE OF HOOK PROCESS OF HAMATE OF RIGHT WRIST, INITIAL ENCOUNTER: ICD-10-CM

## 2025-04-29 DIAGNOSIS — S62.342A CLOSED NONDISPLACED FRACTURE OF BASE OF THIRD METACARPAL BONE OF RIGHT HAND, INITIAL ENCOUNTER: ICD-10-CM

## 2025-04-29 DIAGNOSIS — S62.001A CLOSED NONDISPLACED FRACTURE OF SCAPHOID OF RIGHT WRIST, UNSPECIFIED PORTION OF SCAPHOID, INITIAL ENCOUNTER: Primary | ICD-10-CM

## 2025-05-07 ENCOUNTER — TELEPHONE (OUTPATIENT)
Dept: GASTROENTEROLOGY | Facility: CLINIC | Age: 50
End: 2025-05-07
Payer: COMMERCIAL

## 2025-05-07 NOTE — TELEPHONE ENCOUNTER
Called patient for nurse/ma visit but a woman answered and said he was at work, I asked if there was another number that he can be reached at. She said no I told her I was cancelling this appt and he will need to call back and R/s.